# Patient Record
(demographics unavailable — no encounter records)

---

## 2024-11-26 NOTE — DISCUSSION/SUMMARY
[de-identified] : General Dx Discussion The patient was advised of the diagnosis. The natural history of the pathology was explained in full to the patient in layman's terms. All questions were answered. The risks and benefits of surgical and non-surgical treatment alternatives were explained in full to the patient.  Case Discussed. Decision made today to start orthovisc option s of TKA and medication discussed   orthovisc rt knee tolerated well.

## 2024-11-26 NOTE — HISTORY OF PRESENT ILLNESS
[Gradual] : gradual [5] : 5 [0] : 0 [Localized] : localized [Injection therapy] : injection therapy [] : no [FreeTextEntry1] : rt knee [FreeTextEntry5] : chronic knee pain , has been seen in the past and has injections [FreeTextEntry9] : tylenol [de-identified] : down the stairs [de-identified] : 2023 [de-identified] : Dr. Gonzalez

## 2024-11-26 NOTE — PHYSICAL EXAM
[Right] : right knee [NL (0)] : extension 0 degrees [5___] : hamstring 5[unfilled]/5 [Negative] : negative Julianne's [] : no pain with varus stress [TWNoteComboBox7] : flexion 110 degrees

## 2024-12-11 NOTE — DISCUSSION/SUMMARY
[de-identified] : General Dx Discussion The patient was advised of the diagnosis. The natural history of the pathology was explained in full to the patient in layman's terms. All questions were answered. The risks and benefits of surgical and non-surgical treatment alternatives were explained in full to the patient.  Case Discussed.  orthovisc rt knee tolerated well.  f/up 1 week to continue

## 2024-12-11 NOTE — DISCUSSION/SUMMARY
[de-identified] : General Dx Discussion The patient was advised of the diagnosis. The natural history of the pathology was explained in full to the patient in layman's terms. All questions were answered. The risks and benefits of surgical and non-surgical treatment alternatives were explained in full to the patient.  Case Discussed.  orthovisc rt knee tolerated well.  f/up 1 week to continue

## 2024-12-11 NOTE — HISTORY OF PRESENT ILLNESS
[Localized] : localized [Injection therapy] : injection therapy [2] : 2 [Orthovisc] : Orthovisc [de-identified] : Here for f/up R knee [] : no [FreeTextEntry1] : rt knee [de-identified] : 11-26-24 [de-identified] : rt knee

## 2024-12-11 NOTE — HISTORY OF PRESENT ILLNESS
[Localized] : localized [Injection therapy] : injection therapy [2] : 2 [Orthovisc] : Orthovisc [de-identified] : Here for f/up R knee [] : no [FreeTextEntry1] : rt knee [de-identified] : 11-26-24 [de-identified] : rt knee

## 2024-12-17 NOTE — HISTORY OF PRESENT ILLNESS
[Localized] : localized [Nothing helps with pain getting better] : Nothing helps with pain getting better [3] : 3 [Orthovisc] : Orthovisc [de-identified] : Here for f/u R knee [] : no [FreeTextEntry1] : rt knee [de-identified] : 12-10-24 [de-identified] : rt knee

## 2024-12-17 NOTE — DISCUSSION/SUMMARY
[de-identified] : General Dx Discussion The patient was advised of the diagnosis. The natural history of the pathology was explained in full to the patient in layman's terms. All questions were answered. The risks and benefits of surgical and non-surgical treatment alternatives were explained in full to the patient.  Case Discussed.  orthovisc rt knee tolerated well.  f/u as needed.    Entered by Elisa SHAH acting as a scribe. Instructions: Dr. Gonzalez- The documentation recorded by the scribe accurately reflects the service I personally performed and the decisions made by me.

## 2024-12-17 NOTE — PHYSICAL EXAM
[Right] : right knee [NL (0)] : extension 0 degrees [5___] : hamstring 5[unfilled]/5 [Negative] : negative Julianne's [] : patient ambulates without assistive device [TWNoteComboBox7] : flexion 110 degrees

## 2024-12-27 NOTE — DISCUSSION/SUMMARY
[de-identified] : General Dx Discussion The patient was advised of the diagnosis. The natural history of the pathology was explained in full to the patient in layman's terms. All questions were answered. The risks and benefits of surgical and non-surgical treatment alternatives were explained in full to the patient.  Case Discussed.  f/u as needed.

## 2024-12-27 NOTE — HISTORY OF PRESENT ILLNESS
[4] : 4 [Orthovisc] : Orthovisc [0] : 0 [Localized] : localized [Injection therapy] : injection therapy [de-identified] : Patient has history of OA, treats with Dr Gonzalez, tolerating orthovisc well.  [] : no [FreeTextEntry1] : rt knee [de-identified] : 12-17-24 [de-identified] : rt knee

## 2025-02-19 NOTE — PHYSICAL EXAM
[Alert] : alert [Obese] : obese [No Acute Distress] : no acute distress [Well Developed] : well developed [Normal Voice/Communication] : normal voice communication [PERRL] : pupils equal, round and reactive to light [Normal Hearing] : hearing was normal [No Neck Mass] : no neck mass was observed [Thyroid Not Enlarged] : the thyroid was not enlarged [No Thyroid Nodules] : no palpable thyroid nodules [No Respiratory Distress] : no respiratory distress [Clear to Auscultation] : lungs were clear to auscultation bilaterally [Normal Rate] : heart rate was normal [Regular Rhythm] : with a regular rhythm [No Edema] : no peripheral edema [Normal Bowel Sounds] : normal bowel sounds [Soft] : abdomen soft [Normal Supraclavicular Nodes] : no supraclavicular lymphadenopathy [Normal Anterior Cervical Nodes] : no anterior cervical lymphadenopathy [No Clubbing, Cyanosis] : no clubbing  or cyanosis of the fingernails [Normal Reflexes] : deep tendon reflexes were 2+ and symmetric [No Tremors] : no tremors [Oriented x3] : oriented to person, place, and time [Normal Affect] : the affect was normal [Normal Insight/Judgement] : insight and judgment were intact [Normal Mood] : the mood was normal

## 2025-02-19 NOTE — ASSESSMENT
[Carbohydrate Consistent Diet] : carbohydrate consistent diet [Importance of Diet and Exercise] : importance of diet and exercise to improve glycemic control, achieve weight loss and improve cardiovascular health [Exercise/Effect on Glucose] : exercise/effect on glucose [FreeTextEntry1] : 80 y.o. Female with PMHx of T2D, CAD s/p CABG and PCI, HLD, follows for DM management. Transfers care from Dr. Artis. Last seen by Qing Nielsen NP  Currently on: Ozempic 0.25 mcg weekly  # T2D A1C remains in pre DM range 5.9% in 9/2024 No recent BW. Will do it in the next few days.  Continue current regimen  # Obesity Discussed dietary and lifestyle modification Encouraged walks for about 30 min. at least 3 x week.  # HLD Continue Zetia and Praulent injections by Cardiology Low fat/cholesterol diet  # MNG Last US in 9/2024 - stable b/l nodules with largest RMP 2.8 cm TR4 and LMP 2.1 cm TR4 Will repeat US upon next visit in 6 months.   # Hx of PHPT Hx of right parathyroidectomy >10y. with Dr. Peguero Ca and PTH have been normal  # ?Hx of OTP followed by Gyn Patient will fax over DEXA scan Continue Ca and Vit D supplements.   F/u in 6 months.